# Patient Record
(demographics unavailable — no encounter records)

---

## 2019-07-05 NOTE — REP
Clinical:  Left hip pain.

 

Technique:  Neutral and frog lateral views of the left hip.

 

Findings:

No acute fracture dislocation.  Skeletal structures, joint spaces, and

surrounding soft tissues are normal.

 

Impression:

Normal left hip radiographs.

 

 

Electronically Signed by

Rey Celeste MD 07/05/2019 11:35 A